# Patient Record
Sex: FEMALE | Race: WHITE | Employment: UNEMPLOYED | URBAN - NONMETROPOLITAN AREA
[De-identification: names, ages, dates, MRNs, and addresses within clinical notes are randomized per-mention and may not be internally consistent; named-entity substitution may affect disease eponyms.]

---

## 2017-07-25 ENCOUNTER — HOSPITAL ENCOUNTER (EMERGENCY)
Age: 32
Discharge: HOME | End: 2017-07-25
Attending: EMERGENCY MEDICINE
Payer: MEDICARE

## 2017-07-25 ENCOUNTER — APPOINTMENT (OUTPATIENT)
Dept: GENERAL RADIOLOGY | Age: 32
End: 2017-07-25
Payer: MEDICARE

## 2017-07-25 VITALS
SYSTOLIC BLOOD PRESSURE: 139 MMHG | HEART RATE: 97 BPM | HEIGHT: 69 IN | WEIGHT: 176 LBS | RESPIRATION RATE: 16 BRPM | TEMPERATURE: 97 F | BODY MASS INDEX: 26.07 KG/M2 | OXYGEN SATURATION: 100 % | DIASTOLIC BLOOD PRESSURE: 87 MMHG

## 2017-07-25 DIAGNOSIS — S63.91XA HAND SPRAIN, RIGHT, INITIAL ENCOUNTER: Primary | ICD-10-CM

## 2017-07-25 PROCEDURE — 6370000000 HC RX 637 (ALT 250 FOR IP): Performed by: EMERGENCY MEDICINE

## 2017-07-25 PROCEDURE — 99283 EMERGENCY DEPT VISIT LOW MDM: CPT

## 2017-07-25 PROCEDURE — 73130 X-RAY EXAM OF HAND: CPT | Performed by: RADIOLOGY

## 2017-07-25 PROCEDURE — 73130 X-RAY EXAM OF HAND: CPT

## 2017-07-25 RX ORDER — IBUPROFEN 800 MG/1
800 TABLET ORAL ONCE
Status: COMPLETED | OUTPATIENT
Start: 2017-07-25 | End: 2017-07-25

## 2017-07-25 RX ADMIN — IBUPROFEN 800 MG: 800 TABLET, FILM COATED ORAL at 16:49

## 2017-07-25 ASSESSMENT — PAIN SCALES - GENERAL
PAINLEVEL_OUTOF10: 6
PAINLEVEL_OUTOF10: 6

## 2017-07-25 ASSESSMENT — PAIN DESCRIPTION - ORIENTATION: ORIENTATION: RIGHT;OUTER

## 2017-07-25 ASSESSMENT — PAIN DESCRIPTION - ONSET: ONSET: GRADUAL

## 2017-07-25 ASSESSMENT — PAIN DESCRIPTION - DESCRIPTORS: DESCRIPTORS: THROBBING;ACHING

## 2017-07-25 ASSESSMENT — PAIN DESCRIPTION - PROGRESSION: CLINICAL_PROGRESSION: GRADUALLY WORSENING

## 2017-07-25 ASSESSMENT — PAIN DESCRIPTION - LOCATION: LOCATION: HAND

## 2017-07-25 ASSESSMENT — PAIN DESCRIPTION - PAIN TYPE: TYPE: ACUTE PAIN

## 2017-07-25 ASSESSMENT — PAIN DESCRIPTION - FREQUENCY: FREQUENCY: CONTINUOUS

## 2018-03-23 ENCOUNTER — OFFICE VISIT (OUTPATIENT)
Dept: PRIMARY CARE CLINIC | Age: 33
End: 2018-03-23
Payer: MEDICARE

## 2018-03-23 VITALS
BODY MASS INDEX: 25.01 KG/M2 | WEIGHT: 165 LBS | TEMPERATURE: 97.1 F | DIASTOLIC BLOOD PRESSURE: 70 MMHG | HEART RATE: 72 BPM | HEIGHT: 68 IN | RESPIRATION RATE: 17 BRPM | OXYGEN SATURATION: 97 % | SYSTOLIC BLOOD PRESSURE: 104 MMHG

## 2018-03-23 DIAGNOSIS — J01.40 ACUTE NON-RECURRENT PANSINUSITIS: Primary | ICD-10-CM

## 2018-03-23 DIAGNOSIS — J02.9 SORE THROAT: ICD-10-CM

## 2018-03-23 LAB — S PYO AG THROAT QL: NORMAL

## 2018-03-23 PROCEDURE — G8484 FLU IMMUNIZE NO ADMIN: HCPCS | Performed by: FAMILY MEDICINE

## 2018-03-23 PROCEDURE — G8419 CALC BMI OUT NRM PARAM NOF/U: HCPCS | Performed by: FAMILY MEDICINE

## 2018-03-23 PROCEDURE — 1036F TOBACCO NON-USER: CPT | Performed by: FAMILY MEDICINE

## 2018-03-23 PROCEDURE — 99214 OFFICE O/P EST MOD 30 MIN: CPT | Performed by: FAMILY MEDICINE

## 2018-03-23 PROCEDURE — 87880 STREP A ASSAY W/OPTIC: CPT | Performed by: FAMILY MEDICINE

## 2018-03-23 PROCEDURE — G8427 DOCREV CUR MEDS BY ELIG CLIN: HCPCS | Performed by: FAMILY MEDICINE

## 2018-03-23 RX ORDER — AMOXICILLIN 500 MG/1
500 CAPSULE ORAL 2 TIMES DAILY
Qty: 20 CAPSULE | Refills: 0 | Status: SHIPPED | OUTPATIENT
Start: 2018-03-23

## 2018-03-23 RX ORDER — FLUCONAZOLE 150 MG/1
TABLET ORAL
Qty: 2 TABLET | Refills: 0 | Status: SHIPPED | OUTPATIENT
Start: 2018-03-23

## 2018-03-23 ASSESSMENT — ENCOUNTER SYMPTOMS
VOMITING: 0
ABDOMINAL PAIN: 1
SORE THROAT: 1
CHANGE IN BOWEL HABIT: 0
NAUSEA: 0
COUGH: 1

## 2018-03-23 NOTE — PROGRESS NOTES
[facility]  [address] [facility]  Dept: [phone]  Dept Fax: [phone]  Loc: [phone]    Flo Hong is a 27 y.o. female who presents today for her medical conditions/complaints as noted below. Flo Hong is c/o of   Chief Complaint   Patient presents with    Pharyngitis     Started 4 days ago, sinus pain for 3 weeks, congestion, headache, body aches. No fever, cough at night. HPI:     Here today for a sore throat. Pharyngitis   This is a new problem. The current episode started in the past 7 days (4 days ago; but sinuses have been hurting for weeks). The problem occurs constantly. The problem has been gradually worsening. Associated symptoms include abdominal pain (very mild suprapubic), chills, congestion, coughing, diaphoresis, fatigue, headaches, myalgias and a sore throat. Pertinent negatives include no change in bowel habit, fever, nausea or vomiting. She has tried acetaminophen (flonase, robitussion) for the symptoms. The treatment provided mild relief. Past Medical History:   Diagnosis Date    Anemia     Anxiety     Arthritis     Degenerative disc disease at L5-S1 level     Depression     Hyperthyroidism     Interstitial cystitis 2009    Dx by urologist in [address] [place] [address] Osteoporosis     Seasonal allergies     Ulcerative colitis ([facility] [address]           Social History   Substance Use Topics    Smoking status: Former Smoker    Smokeless tobacco: Never Used      Comment: quit 4-5 years ago    Alcohol use No      Current Outpatient Prescriptions   Medication Sig Dispense Refill    amoxicillin (AMOXIL) 500 MG capsule Take 1 capsule by mouth 2 times daily 20 capsule 0    fluconazole (DIFLUCAN) 150 MG tablet Take 1 pill now, 1 pill in a week.  2 tablet 0    amitriptyline (ELAVIL) 50 MG tablet Take 50 mg by mouth nightly      estradiol (ESTRACE) 2 MG tablet Take 2 mg by mouth daily        No current facility-administered medications for this visit. Allergies   Allergen Reactions    Codeine Shortness Of Breath and Nausea And Vomiting    Morphine Anaphylaxis     SOB, N/V    Nubain [Nalbuphine] Shortness Of Breath and Nausea And Vomiting    Sulfa Antibiotics Shortness Of Breath and Nausea And Vomiting    Vicodin [Hydrocodone-Acetaminophen] Shortness Of Breath and Nausea And Vomiting    Darvon [Propoxyphene] Nausea Only     Darvocet       Subjective:      Review of Systems   Constitutional: Positive for chills, diaphoresis and fatigue. Negative for fever. HENT: Positive for congestion and sore throat. Respiratory: Positive for cough. Gastrointestinal: Positive for abdominal pain (very mild suprapubic). Negative for change in bowel habit, nausea and vomiting. Musculoskeletal: Positive for myalgias. Neurological: Positive for headaches. Objective:     Physical Exam   Constitutional: She is oriented to person, place, and time. She appears well-developed and well-nourished. No distress. HENT:   Right Ear: Tympanic membrane, external ear and ear canal normal.   Left Ear: Tympanic membrane, external ear and ear canal normal.   Nose: Mucosal edema present. Right sinus exhibits maxillary sinus tenderness and frontal sinus tenderness. Left sinus exhibits maxillary sinus tenderness and frontal sinus tenderness. Mouth/Throat: Oropharynx is clear and moist. No oropharyngeal exudate. Eyes: Conjunctivae and EOM are normal. Pupils are equal, round, and reactive to light. Neck: Normal range of motion. Neck supple. Cardiovascular: Normal rate, regular rhythm, normal heart sounds and intact distal pulses. No murmur heard. Pulmonary/Chest: Effort normal and breath sounds normal. No respiratory distress. She has no wheezes. She has no rales. Lymphadenopathy:     She has no cervical adenopathy. Neurological: She is alert and oriented to person, place, and time.    Skin: Skin is warm and

## 2018-09-07 ENCOUNTER — TELEPHONE (OUTPATIENT)
Dept: FAMILY MEDICINE CLINIC | Age: 33
End: 2018-09-07